# Patient Record
Sex: FEMALE | Race: WHITE | Employment: FULL TIME | URBAN - METROPOLITAN AREA
[De-identification: names, ages, dates, MRNs, and addresses within clinical notes are randomized per-mention and may not be internally consistent; named-entity substitution may affect disease eponyms.]

---

## 2018-10-31 ENCOUNTER — HOSPITAL ENCOUNTER (EMERGENCY)
Age: 18
Discharge: HOME | End: 2018-10-31
Attending: EMERGENCY MEDICINE
Payer: COMMERCIAL

## 2018-10-31 VITALS
WEIGHT: 200 LBS | HEART RATE: 103 BPM | SYSTOLIC BLOOD PRESSURE: 125 MMHG | BODY MASS INDEX: 37.76 KG/M2 | DIASTOLIC BLOOD PRESSURE: 93 MMHG | RESPIRATION RATE: 17 BRPM | OXYGEN SATURATION: 98 % | HEIGHT: 61 IN | TEMPERATURE: 98.6 F

## 2018-10-31 DIAGNOSIS — N93.9 VAGINAL BLEEDING: Primary | ICD-10-CM

## 2018-10-31 LAB
ALBUMIN SERPL-MCNC: 4.1 GM/DL (ref 3.4–5)
ALP BLD-CCNC: 119 IU/L (ref 40–129)
ALT SERPL-CCNC: 20 U/L (ref 10–40)
ANION GAP SERPL CALCULATED.3IONS-SCNC: 14 MMOL/L (ref 4–16)
AST SERPL-CCNC: 17 IU/L (ref 15–37)
BASOPHILS ABSOLUTE: 0.1 K/CU MM
BASOPHILS RELATIVE PERCENT: 0.4 % (ref 0–1)
BILIRUB SERPL-MCNC: 0.2 MG/DL (ref 0–1)
BUN BLDV-MCNC: 11 MG/DL (ref 6–23)
CALCIUM SERPL-MCNC: 9.3 MG/DL (ref 8.3–10.6)
CHLORIDE BLD-SCNC: 101 MMOL/L (ref 99–110)
CO2: 23 MMOL/L (ref 21–32)
CREAT SERPL-MCNC: 0.7 MG/DL (ref 0.6–1.1)
DIFFERENTIAL TYPE: ABNORMAL
EOSINOPHILS ABSOLUTE: 0.1 K/CU MM
EOSINOPHILS RELATIVE PERCENT: 0.7 % (ref 0–3)
GFR AFRICAN AMERICAN: >60 ML/MIN/1.73M2
GFR NON-AFRICAN AMERICAN: >60 ML/MIN/1.73M2
GLUCOSE BLD-MCNC: 94 MG/DL (ref 70–99)
GONADOTROPIN, CHORIONIC (HCG) QUANT: <0.5 UIU/ML
HCT VFR BLD CALC: 46.4 % (ref 37–47)
HEMOGLOBIN: 14.8 GM/DL (ref 12.5–16)
IMMATURE NEUTROPHIL %: 0.6 % (ref 0–0.43)
LYMPHOCYTES ABSOLUTE: 3.3 K/CU MM
LYMPHOCYTES RELATIVE PERCENT: 20.6 % (ref 24–44)
MCH RBC QN AUTO: 28.7 PG (ref 27–31)
MCHC RBC AUTO-ENTMCNC: 31.9 % (ref 32–36)
MCV RBC AUTO: 90.1 FL (ref 78–100)
MONOCYTES ABSOLUTE: 0.9 K/CU MM
MONOCYTES RELATIVE PERCENT: 5.3 % (ref 0–4)
NUCLEATED RBC %: 0 %
PDW BLD-RTO: 12.7 % (ref 11.7–14.9)
PLATELET # BLD: 367 K/CU MM (ref 140–440)
PMV BLD AUTO: 9.3 FL (ref 7.5–11.1)
POTASSIUM SERPL-SCNC: 3.8 MMOL/L (ref 3.5–5.1)
RBC # BLD: 5.15 M/CU MM (ref 4.2–5.4)
SEGMENTED NEUTROPHILS ABSOLUTE COUNT: 11.8 K/CU MM
SEGMENTED NEUTROPHILS RELATIVE PERCENT: 72.4 % (ref 36–66)
SODIUM BLD-SCNC: 138 MMOL/L (ref 135–145)
TOTAL IMMATURE NEUTOROPHIL: 0.09 K/CU MM
TOTAL NUCLEATED RBC: 0 K/CU MM
TOTAL PROTEIN: 8 GM/DL (ref 6.4–8.2)
WBC # BLD: 16.2 K/CU MM (ref 4–10.5)

## 2018-10-31 PROCEDURE — 86900 BLOOD TYPING SEROLOGIC ABO: CPT

## 2018-10-31 PROCEDURE — 86901 BLOOD TYPING SEROLOGIC RH(D): CPT

## 2018-10-31 PROCEDURE — 99283 EMERGENCY DEPT VISIT LOW MDM: CPT

## 2018-10-31 PROCEDURE — 84702 CHORIONIC GONADOTROPIN TEST: CPT

## 2018-10-31 PROCEDURE — 36415 COLL VENOUS BLD VENIPUNCTURE: CPT

## 2018-10-31 PROCEDURE — 80053 COMPREHEN METABOLIC PANEL: CPT

## 2018-10-31 PROCEDURE — 85025 COMPLETE CBC W/AUTO DIFF WBC: CPT

## 2018-10-31 ASSESSMENT — ENCOUNTER SYMPTOMS
EYE REDNESS: 0
BACK PAIN: 0
NAUSEA: 0
COUGH: 0
SHORTNESS OF BREATH: 1
ABDOMINAL PAIN: 1
SORE THROAT: 0
RHINORRHEA: 0
VOMITING: 0

## 2018-10-31 ASSESSMENT — PAIN DESCRIPTION - DESCRIPTORS: DESCRIPTORS: CRAMPING

## 2018-10-31 ASSESSMENT — PAIN SCALES - GENERAL: PAINLEVEL_OUTOF10: 2

## 2018-10-31 ASSESSMENT — PAIN DESCRIPTION - PAIN TYPE: TYPE: ACUTE PAIN

## 2018-10-31 ASSESSMENT — PAIN DESCRIPTION - ORIENTATION: ORIENTATION: LOWER

## 2018-10-31 ASSESSMENT — PAIN DESCRIPTION - LOCATION: LOCATION: ABDOMEN

## 2018-10-31 NOTE — ED TRIAGE NOTES
Patient presents ot ER today for vaginal bleeding starting on October 10th. . Patient states she was due to start her period, and that she has since non stop bled. States varies from light to moderate amounts with occasional clots.  Patient reports mild abd cramping and fatigue

## 2019-02-18 ENCOUNTER — HOSPITAL ENCOUNTER (EMERGENCY)
Age: 19
Discharge: LEFT BEFORE DISCHARGE | End: 2019-02-18
Payer: COMMERCIAL

## 2019-02-18 VITALS
BODY MASS INDEX: 41.54 KG/M2 | OXYGEN SATURATION: 98 % | WEIGHT: 220 LBS | RESPIRATION RATE: 16 BRPM | HEART RATE: 76 BPM | SYSTOLIC BLOOD PRESSURE: 127 MMHG | TEMPERATURE: 98.2 F | DIASTOLIC BLOOD PRESSURE: 74 MMHG | HEIGHT: 61 IN

## 2019-02-18 PROCEDURE — 93005 ELECTROCARDIOGRAM TRACING: CPT | Performed by: EMERGENCY MEDICINE

## 2019-02-18 PROCEDURE — 4500000002 HC ER NO CHARGE

## 2019-02-18 ASSESSMENT — PAIN SCALES - GENERAL: PAINLEVEL_OUTOF10: 4

## 2019-02-18 ASSESSMENT — PAIN DESCRIPTION - LOCATION: LOCATION: ABDOMEN;CHEST

## 2019-02-18 ASSESSMENT — PAIN DESCRIPTION - PAIN TYPE: TYPE: ACUTE PAIN

## 2019-02-19 PROCEDURE — 93010 ELECTROCARDIOGRAM REPORT: CPT | Performed by: INTERNAL MEDICINE

## 2019-02-20 LAB
EKG ATRIAL RATE: 78 BPM
EKG DIAGNOSIS: NORMAL
EKG P AXIS: 25 DEGREES
EKG P-R INTERVAL: 154 MS
EKG Q-T INTERVAL: 370 MS
EKG QRS DURATION: 84 MS
EKG QTC CALCULATION (BAZETT): 421 MS
EKG R AXIS: 10 DEGREES
EKG T AXIS: 9 DEGREES
EKG VENTRICULAR RATE: 78 BPM

## 2022-11-17 ENCOUNTER — OFFICE VISIT (OUTPATIENT)
Dept: FAMILY MEDICINE CLINIC | Age: 22
End: 2022-11-17
Payer: COMMERCIAL

## 2022-11-17 ENCOUNTER — HOSPITAL ENCOUNTER (OUTPATIENT)
Age: 22
Setting detail: SPECIMEN
Discharge: HOME | End: 2022-11-17
Payer: COMMERCIAL

## 2022-11-17 VITALS — WEIGHT: 205 LBS | HEART RATE: 104 BPM | BODY MASS INDEX: 38.73 KG/M2 | OXYGEN SATURATION: 99 % | TEMPERATURE: 97.7 F

## 2022-11-17 DIAGNOSIS — J10.1 INFLUENZA A: Primary | ICD-10-CM

## 2022-11-17 DIAGNOSIS — R68.89 FLU-LIKE SYMPTOMS: ICD-10-CM

## 2022-11-17 DIAGNOSIS — J02.9 SORE THROAT: ICD-10-CM

## 2022-11-17 LAB
INFLUENZA A ANTIBODY: POSITIVE
INFLUENZA B ANTIBODY: ABNORMAL

## 2022-11-17 PROCEDURE — 99203 OFFICE O/P NEW LOW 30 MIN: CPT | Performed by: NURSE PRACTITIONER

## 2022-11-17 PROCEDURE — U0003 INFECTIOUS AGENT DETECTION BY NUCLEIC ACID (DNA OR RNA); SEVERE ACUTE RESPIRATORY SYNDROME CORONAVIRUS 2 (SARS-COV-2) (CORONAVIRUS DISEASE [COVID-19]), AMPLIFIED PROBE TECHNIQUE, MAKING USE OF HIGH THROUGHPUT TECHNOLOGIES AS DESCRIBED BY CMS-2020-01-R: HCPCS

## 2022-11-17 PROCEDURE — 87804 INFLUENZA ASSAY W/OPTIC: CPT | Performed by: NURSE PRACTITIONER

## 2022-11-17 PROCEDURE — U0005 INFEC AGEN DETEC AMPLI PROBE: HCPCS

## 2022-11-17 RX ORDER — OSELTAMIVIR PHOSPHATE 75 MG/1
75 CAPSULE ORAL 2 TIMES DAILY
Qty: 10 CAPSULE | Refills: 0 | Status: SHIPPED | OUTPATIENT
Start: 2022-11-17 | End: 2022-11-22

## 2022-11-17 NOTE — PATIENT INSTRUCTIONS
Your COVID 19 test can take 1-5 days for the results to come back. We ask that you make a Mychart page and view your test results this way. You are encouraged to Self quarantine until you know your results. If positive, please work on contact tracing. Increase fluids and rest  Saline nasal spray as needed for nasal congestion  Warm salt water gargles as needed for throat discomfort  Monitor temperature twice a day  Tylenol as needed for fevers and/or discomfort. Big deep breaths periodically throughout the day  Regular Mucinex over the counter as needed for chest congestion  If symptoms worsen -Go to the ER. Follow up with your primary care provider      To Whom it May Concern:    Tiarra Cotto was tested for COVID-19 11/17/2022. He/she must stay home until test results are back. If test is negative, ok to return to work/school. If test is positive, isolate for a total of 5 days, starting from day 1 of symptom onset. After 5 days, if fever-free for 24 hours and there has been a gradual improvement in symptoms, may come out of isolation, but must consistently wear a mask when around other people for 5 additional days. (5 days isolation, 5 days mask-wearing). We do not recommend retesting as patients may continue to test positive for months even though no longer contagious. It is suggested you call [address] or [address] with any questions regarding isolation timeframe/return to work/school details.

## 2022-11-17 NOTE — PROGRESS NOTES
11/17/22  Vasile Stewart  2000    FLU/COVID-19 CLINIC EVALUATION    HPI SYMPTOMS:    Employer:    [x] Fevers  [] Chills  [x] Cough  [] Coughing up blood  [] Chest Congestion  [] Nasal Congestion  [] Feeling short of breath  [] Sometimes  [] Frequently  [] All the time  [x] Chest pain  [x] Headaches  [x]Tolerable  [] Severe  [x] Sore throat  [x] Muscle aches  [x] Nausea  [] Vomiting  []Unable to keep fluids down  [x] Diarrhea  []Severe    [x] OTHER SYMPTOMS:    Fatigue    Symptom Duration:   [] 1  [] 2   [] 3   [] 4    [] 5   [] 6   [] 7   [] 8   [] 9   [] 10   [] 11   [] 12   [] 13   [] 14   [] Longer than 14 days    Symptom course:   [] Worsening     [x] Stable     [] Improving    RISK FACTORS:    [] Pregnant or possibly pregnant  [] Age over 61  [] Diabetes  [] Heart disease  [] Asthma  [] COPD/Other chronic lung diseases  [] Active Cancer  [] On Chemotherapy  [] Taking oral steroids  [] History Lymphoma/Leukemia  [] Close contact with a lab confirmed COVID-19 patient within 14 days of symptom onset  [] History of travel from affected geographical areas within 14 days of symptom onset       VITALS:  There were no vitals filed for this visit. TESTS:    POCT FLU:  [] Positive     []Negative    ASSESSMENT:    [] Flu  [] Possible COVID-19  [] Strep    PLAN:    [] Discharge home with written instructions for:  [] Flu management  [] Possible COVID-19 infection with self-quarantine and management of symptoms  [] Follow-up with primary care physician or emergency department if worsens  [] Evaluation per physician/NP/PA in clinic  [] Sent to ER       An  electronic signature was used to authenticate this note.      --Nila Green on 11/17/2022 at 8:48 AM

## 2022-11-17 NOTE — PROGRESS NOTES
11/17/2022    HPI:  Chief complaint and history of present illness as per medical assistant/nurse documented today in the Flu/COVID-19 clinic. Patient is here with complaints of cough, headache, muscle aches, nausea, diarrhea, and fatigue that started last night. Patient states she has had problems with a sore throat since September. Patient states she recently saw an ENT and he started her on Clindamycin. Patient states she is not here for the sore throat - just the symptoms that started las night. Patient states she has had the covid vaccine. MEDICATIONS:  Prior to Visit Medications    Not on File       No Known Allergies, History reviewed. No pertinent past medical history. , History reviewed. No pertinent surgical history. ,   Social History     Tobacco Use    Smoking status: Never    Smokeless tobacco: Never   Substance Use Topics    Alcohol use: No    Drug use: No   , History reviewed. No pertinent family history. ,   There is no immunization history on file for this patient.,   Health Maintenance   Topic Date Due    COVID-19 Vaccine (1) Never done    Varicella vaccine (1 of 2 - 2-dose childhood series) Never done    HPV vaccine (1 - 2-dose series) Never done    Depression Screen  Never done    HIV screen  Never done    Chlamydia/GC screen  Never done    Hepatitis C screen  Never done    DTaP/Tdap/Td vaccine (1 - Tdap) Never done    Pap smear  Never done    Flu vaccine (1) Never done    Hepatitis A vaccine  Aged Out    Hib vaccine  Aged Out    Meningococcal (ACWY) vaccine  Aged Out    Pneumococcal 0-64 years Vaccine  Aged Out       PHYSICAL EXAM:  Physical Exam  Constitutional:       Appearance: Normal appearance. HENT:      Head: Normocephalic. Right Ear: Tympanic membrane, ear canal and external ear normal.      Left Ear: Tympanic membrane, ear canal and external ear normal.      Nose: Nose normal.      Mouth/Throat:      Lips: Pink.       Mouth: Mucous membranes are moist.      Pharynx: Posterior oropharyngeal erythema present. Cardiovascular:      Rate and Rhythm: Normal rate and regular rhythm. Heart sounds: Normal heart sounds. Pulmonary:      Effort: Pulmonary effort is normal.      Breath sounds: Normal breath sounds. Musculoskeletal:      Cervical back: Neck supple. Skin:     General: Skin is warm and dry. Neurological:      Mental Status: She is alert and oriented to person, place, and time. Psychiatric:         Mood and Affect: Mood normal.         Behavior: Behavior normal.       ASSESSMENT/PLAN:  1. Influenza A  Advised to take medication as prescribed. - oseltamivir (TAMIFLU) 75 MG capsule; Take 1 capsule by mouth 2 times daily for 5 days  Dispense: 10 capsule; Refill: 0    2. Flu-like symptoms  Positive for Influenza A . Patient wanted the covid test sent out. Your COVID 19 test can take 1-5 days for the results to come back. We ask that you make a Mychart page and view your test results this way. You are encouraged to Self quarantine until you know your results. If positive, please work on contact tracing. Increase fluids and rest  Saline nasal spray as needed for nasal congestion  Warm salt water gargles as needed for throat discomfort  Monitor temperature twice a day  Tylenol as needed for fevers and/or discomfort. Big deep breaths periodically throughout the day  Regular Mucinex over the counter as needed for chest congestion  If symptoms worsen -Go to the ER. Follow up with your primary care provider  - POCT Influenza A/B  - COVID-19    3. Sore throat  Continue Clindamycin prescribed by ENT. FOLLOW-UP:  Return if symptoms worsen or fail to improve.     In addition to other information, the printed after visit summary provided to the patient includes:  [x] COVID-19 Self care instructions  [x] COVID-19 General patient information

## 2022-11-18 ENCOUNTER — TELEPHONE (OUTPATIENT)
Dept: FAMILY MEDICINE CLINIC | Age: 22
End: 2022-11-18

## 2022-11-18 LAB
SARS-COV-2: NOT DETECTED
SOURCE: NORMAL

## 2022-11-18 NOTE — TELEPHONE ENCOUNTER
Pt called stating she needs a note stating when she may return to work and without restrictions. Pt was informed the provider that saw her would not be in the office before Monday so this may not be completed before then. Please advise.

## 2022-11-18 NOTE — LETTER
[address] [facility] [facility]  [address] [place] [address]  [id number]  Phone: [phone]  Fax: [phone]    LEANDRA Evans CNP        November 18, 2022     Patient: Tiarra Cough   YOB: 2000   Date of Visit: 11/17/2022       To Whom it May Concern:    Richard Landers was seen in my clinic on 11/17/2022. She may return to work on 11/21/2022 with no restrictions as long as fever free for 24 hours. If you have any questions or concerns, please don't hesitate to call.     Sincerely,         LEANDRA Evans CNP

## 2022-11-18 NOTE — TELEPHONE ENCOUNTER
Pt never said she was taken off work with restrictions, pt needs a note stating she may return to work and it has to say without restrictions. The note received in the AVS states she is off work until results come back on the covid test. Since pt tested positive for the flu and negative for covid, she needs a note stating when she may return to work and it has to say without restrictions.

## 2022-11-18 NOTE — Clinical Note
[address]  Phone: [phone]  Fax: [phone]    LEANDRA Selby CNP        November 18, 2022    [address] [place] [address]      Dear Barry Haro:    ***    If you have any questions or concerns, please don't hesitate to call.     Sincerely,        LEANDRA Selby CNP
Statement Selected

## 2023-02-28 ENCOUNTER — HOSPITAL ENCOUNTER (EMERGENCY)
Age: 23
Discharge: LEFT BEFORE DISCHARGE | End: 2023-02-28
Payer: COMMERCIAL

## 2023-02-28 VITALS
RESPIRATION RATE: 18 BRPM | TEMPERATURE: 99.4 F | OXYGEN SATURATION: 100 % | SYSTOLIC BLOOD PRESSURE: 104 MMHG | HEART RATE: 110 BPM | DIASTOLIC BLOOD PRESSURE: 70 MMHG

## 2023-02-28 DIAGNOSIS — Z53.21 ELOPED FROM EMERGENCY DEPARTMENT: Primary | ICD-10-CM

## 2023-02-28 LAB
BACTERIA: NEGATIVE /HPF
BILIRUBIN URINE: NEGATIVE MG/DL
BLOOD, URINE: NEGATIVE
CLARITY: CLEAR
COLOR: YELLOW
GLUCOSE, URINE: NEGATIVE MG/DL
HYALINE CASTS: 2 /LPF
KETONES, URINE: 40 MG/DL
LEUKOCYTE ESTERASE, URINE: NEGATIVE
MUCUS: ABNORMAL HPF
NITRITE URINE, QUANTITATIVE: NEGATIVE
NON SQUAM EPI CELLS: <1 /HPF
PH, URINE: 6 (ref 5–8)
PREGNANCY TEST URINE, POC: NEGATIVE
PROTEIN UA: 100 MG/DL
RBC URINE: 2 /HPF (ref 0–6)
SPECIFIC GRAVITY UA: 1.02 (ref 1–1.03)
SQUAMOUS EPITHELIAL: 12 /HPF
TRICHOMONAS: ABNORMAL /HPF
UROBILINOGEN, URINE: 0.2 MG/DL (ref 0.2–1)
WBC UA: ABNORMAL /HPF (ref 0–5)

## 2023-02-28 PROCEDURE — 85025 COMPLETE CBC W/AUTO DIFF WBC: CPT

## 2023-02-28 PROCEDURE — 81025 URINE PREGNANCY TEST: CPT

## 2023-02-28 PROCEDURE — 4500000002 HC ER NO CHARGE

## 2023-02-28 PROCEDURE — 80053 COMPREHEN METABOLIC PANEL: CPT

## 2023-02-28 PROCEDURE — 81001 URINALYSIS AUTO W/SCOPE: CPT

## 2023-02-28 NOTE — ED PROVIDER NOTES
Not evaluate or treat this patient. Triage orders were placed at however the patient left prior to being seen.      Destiny Shaffer, LEANDRA - CNP  02/28/23 [id number]

## 2023-03-25 LAB
A/G RATIO: 1.5 (ref 1.2–2.2)
ALBUMIN SERPL-MCNC: 4.2 G/DL (ref 3.9–5)
ALP BLD-CCNC: 79 IU/L (ref 44–121)
ALT SERPL-CCNC: 25 IU/L (ref 0–32)
AST SERPL-CCNC: 21 IU/L (ref 0–40)
BASOPHILS ABSOLUTE: 0 X10E3/UL (ref 0–0.2)
BASOPHILS RELATIVE PERCENT: 0 %
BILIRUB SERPL-MCNC: 0.2 MG/DL (ref 0–1.2)
BUN / CREAT RATIO: 14 (ref 9–23)
BUN BLDV-MCNC: 11 MG/DL (ref 6–20)
CALCIUM SERPL-MCNC: 9.3 MG/DL (ref 8.7–10.2)
CHLORIDE BLD-SCNC: 108 MMOL/L (ref 96–106)
CHOLESTEROL, TOTAL: 145 MG/DL (ref 100–199)
CO2: 20 MMOL/L (ref 20–29)
CREAT SERPL-MCNC: 0.76 MG/DL (ref 0.57–1)
EOSINOPHILS ABSOLUTE: 0.1 X10E3/UL (ref 0–0.4)
EOSINOPHILS RELATIVE PERCENT: 1 %
ESTIMATED GLOMERULAR FILTRATION RATE CREATININE EQUATION: 112 ML/MIN/1.73
GLOBULIN: 2.8 G/DL (ref 1.5–4.5)
GLUCOSE BLD-MCNC: 89 MG/DL (ref 70–99)
HCT VFR BLD CALC: 42.7 % (ref 34–46.6)
HDLC SERPL-MCNC: 42 MG/DL
HEMOGLOBIN: 14.3 G/DL (ref 11.1–15.9)
IMMATURE GRANS (ABS): 0 X10E3/UL (ref 0–0.1)
IMMATURE GRANULOCYTES: 0 %
LDL CHOLESTEROL CALCULATED: 92 MG/DL (ref 0–99)
LYMPHOCYTES ABSOLUTE: 3 X10E3/UL (ref 0.7–3.1)
LYMPHOCYTES RELATIVE PERCENT: 42 %
MCH RBC QN AUTO: 28.9 PG (ref 26.6–33)
MCHC RBC AUTO-ENTMCNC: 33.5 G/DL (ref 31.5–35.7)
MCV RBC AUTO: 86 FL (ref 79–97)
MONOCYTES ABSOLUTE: 0.5 X10E3/UL (ref 0.1–0.9)
MONOCYTES RELATIVE PERCENT: 7 %
NEUTROPHILS ABSOLUTE: 3.5 X10E3/UL (ref 1.4–7)
PDW BLD-RTO: 12.9 % (ref 11.7–15.4)
PLATELET # BLD: 349 X10E3/UL (ref 150–450)
POTASSIUM SERPL-SCNC: 4.6 MMOL/L (ref 3.5–5.2)
RBC # BLD: 4.95 X10E6/UL (ref 3.77–5.28)
SEGMENTED NEUTROPHILS RELATIVE PERCENT: 50 %
SODIUM BLD-SCNC: 139 MMOL/L (ref 134–144)
TOTAL PROTEIN: 7 G/DL (ref 6–8.5)
TRIGL SERPL-MCNC: 50 MG/DL (ref 0–149)
TSH SERPL DL<=0.05 MIU/L-ACNC: 1.12 UIU/ML (ref 0.45–4.5)
VLDLC SERPL CALC-MCNC: 11 MG/DL (ref 5–40)
WBC # BLD: 7.1 X10E3/UL (ref 3.4–10.8)

## 2023-03-31 ENCOUNTER — OFFICE VISIT (OUTPATIENT)
Dept: FAMILY MEDICINE CLINIC | Age: 23
End: 2023-03-31
Payer: COMMERCIAL

## 2023-03-31 VITALS
SYSTOLIC BLOOD PRESSURE: 106 MMHG | HEIGHT: 60 IN | DIASTOLIC BLOOD PRESSURE: 68 MMHG | WEIGHT: 200 LBS | HEART RATE: 86 BPM | TEMPERATURE: 97.9 F | BODY MASS INDEX: 39.27 KG/M2 | OXYGEN SATURATION: 97 %

## 2023-03-31 DIAGNOSIS — Z00.00 ENCOUNTER FOR ROUTINE HISTORY AND PHYSICAL EXAMINATION: Primary | ICD-10-CM

## 2023-03-31 DIAGNOSIS — Z23 NEED FOR INFLUENZA VACCINATION: ICD-10-CM

## 2023-03-31 DIAGNOSIS — Z11.1 TUBERCULOSIS SCREENING: ICD-10-CM

## 2023-03-31 DIAGNOSIS — Z23 NEED FOR TDAP VACCINATION: ICD-10-CM

## 2023-03-31 PROCEDURE — 90472 IMMUNIZATION ADMIN EACH ADD: CPT | Performed by: NURSE PRACTITIONER

## 2023-03-31 PROCEDURE — 99385 PREV VISIT NEW AGE 18-39: CPT | Performed by: NURSE PRACTITIONER

## 2023-03-31 PROCEDURE — 90674 CCIIV4 VAC NO PRSV 0.5 ML IM: CPT | Performed by: NURSE PRACTITIONER

## 2023-03-31 PROCEDURE — 36415 COLL VENOUS BLD VENIPUNCTURE: CPT | Performed by: NURSE PRACTITIONER

## 2023-03-31 PROCEDURE — 90715 TDAP VACCINE 7 YRS/> IM: CPT | Performed by: NURSE PRACTITIONER

## 2023-03-31 PROCEDURE — 90471 IMMUNIZATION ADMIN: CPT | Performed by: NURSE PRACTITIONER

## 2023-03-31 RX ORDER — NORETHINDRONE ACETATE AND ETHINYL ESTRADIOL 1MG-20(21)
1 KIT ORAL DAILY
COMMUNITY

## 2023-03-31 SDOH — ECONOMIC STABILITY: FOOD INSECURITY: WITHIN THE PAST 12 MONTHS, THE FOOD YOU BOUGHT JUST DIDN'T LAST AND YOU DIDN'T HAVE MONEY TO GET MORE.: NEVER TRUE

## 2023-03-31 SDOH — ECONOMIC STABILITY: HOUSING INSECURITY
IN THE LAST 12 MONTHS, WAS THERE A TIME WHEN YOU DID NOT HAVE A STEADY PLACE TO SLEEP OR SLEPT IN A SHELTER (INCLUDING NOW)?: NO

## 2023-03-31 SDOH — ECONOMIC STABILITY: FOOD INSECURITY: WITHIN THE PAST 12 MONTHS, YOU WORRIED THAT YOUR FOOD WOULD RUN OUT BEFORE YOU GOT MONEY TO BUY MORE.: NEVER TRUE

## 2023-03-31 SDOH — ECONOMIC STABILITY: INCOME INSECURITY: HOW HARD IS IT FOR YOU TO PAY FOR THE VERY BASICS LIKE FOOD, HOUSING, MEDICAL CARE, AND HEATING?: NOT HARD AT ALL

## 2023-03-31 ASSESSMENT — ENCOUNTER SYMPTOMS
SHORTNESS OF BREATH: 0
EYES NEGATIVE: 1
CHEST TIGHTNESS: 0
WHEEZING: 0
ALLERGIC/IMMUNOLOGIC NEGATIVE: 1
GASTROINTESTINAL NEGATIVE: 1
COUGH: 0

## 2023-03-31 ASSESSMENT — PATIENT HEALTH QUESTIONNAIRE - PHQ9
2. FEELING DOWN, DEPRESSED OR HOPELESS: 0
SUM OF ALL RESPONSES TO PHQ QUESTIONS 1-9: 0
1. LITTLE INTEREST OR PLEASURE IN DOING THINGS: 0
SUM OF ALL RESPONSES TO PHQ QUESTIONS 1-9: 0
SUM OF ALL RESPONSES TO PHQ9 QUESTIONS 1 & 2: 0

## 2023-03-31 ASSESSMENT — VISUAL ACUITY
OD_CC: 20 70
OS_CC: 20 20

## 2023-03-31 NOTE — PROGRESS NOTES
Right arm lab draw; pt tolerated well. TDAP right arm; pt tolerated well. Flu vaccine left deltoid; pt tolerated well. Vaccine Information Sheet, \"Influenza - Inactivated\"  given to Adriano Patel, or parent/legal guardian of  Adriano Patel and verbalized understanding. Patient responses:    Have you ever had a reaction to a flu vaccine? No  Do you have any current illness? No  Have you ever had Guillian Crocheron Syndrome? No  Do you have a serious allergy to any of the follow: Neomycin, Polymyxin, Thimerosal, eggs or egg products? No    Flu vaccine given per order. Please see immunization tab. Risks and benefits explained. Current VIS given.
Out of Food in the Last Year: Never true    Ran Out of Food in the Last Year: Never true   Transportation Needs: Unknown    Lack of Transportation (Medical): Not on file    Lack of Transportation (Non-Medical): No   Physical Activity: Not on file   Stress: Not on file   Social Connections: Not on file   Intimate Partner Violence: Not on file   Housing Stability: Unknown    Unable to Pay for Housing in the Last Year: Not on file    Number of Places Lived in the Last Year: Not on file    Unstable Housing in the Last Year: No       Review of Systems   Constitutional:  Negative for activity change, appetite change, chills, diaphoresis, fatigue, fever and unexpected weight change. HENT: Negative. Eyes: Negative. Respiratory:  Negative for cough, chest tightness, shortness of breath and wheezing. Cardiovascular:  Negative for chest pain and palpitations. Gastrointestinal: Negative. Endocrine: Negative. Genitourinary: Negative. Musculoskeletal: Negative. Skin: Negative. Allergic/Immunologic: Negative. Neurological: Negative. Hematological: Negative. Psychiatric/Behavioral: Negative. OBJECTIVE:     /68 (Site: Right Upper Arm, Position: Sitting, Cuff Size: Medium Adult)   Pulse 86   Temp 97.9 °F (36.6 °C) (Infrared)   Ht 4' 11.65\" (1.515 m)   Wt 200 lb (90.7 kg)   LMP 03/10/2023 (Approximate)   SpO2 97%   BMI 39.52 kg/m²     Physical Exam  Vitals reviewed. Constitutional:       General: She is not in acute distress. Appearance: Normal appearance. She is well-developed. She is obese. She is not ill-appearing or diaphoretic. HENT:      Head: Normocephalic and atraumatic. Right Ear: Tympanic membrane, ear canal and external ear normal. There is no impacted cerumen. Left Ear: Tympanic membrane, ear canal and external ear normal. There is no impacted cerumen. Nose: Nose normal. No congestion or rhinorrhea.       Mouth/Throat:      Mouth: Mucous

## 2023-04-03 LAB
GAMMA INTERFERON BACKGROUND BLD IA-ACNC: 0.02 IU/ML
MITOGEN IGNF BCKGRD COR BLD-ACNC: >10 IU/ML
QUANTI TB GOLD PLUS: NEGATIVE
QUANTI TB1 MINUS NIL: 0 IU/ML (ref 0–0.34)
QUANTI TB2 MINUS NIL: 0.01 IU/ML (ref 0–0.34)

## 2023-12-26 ENCOUNTER — OFFICE VISIT (OUTPATIENT)
Dept: FAMILY MEDICINE CLINIC | Age: 23
End: 2023-12-26
Payer: COMMERCIAL

## 2023-12-26 VITALS
OXYGEN SATURATION: 98 % | TEMPERATURE: 98.2 F | HEART RATE: 99 BPM | BODY MASS INDEX: 41.11 KG/M2 | WEIGHT: 208 LBS | DIASTOLIC BLOOD PRESSURE: 70 MMHG | SYSTOLIC BLOOD PRESSURE: 112 MMHG

## 2023-12-26 DIAGNOSIS — U07.1 COVID-19: Primary | ICD-10-CM

## 2023-12-26 DIAGNOSIS — R05.1 ACUTE COUGH: ICD-10-CM

## 2023-12-26 DIAGNOSIS — R09.81 NASAL CONGESTION: ICD-10-CM

## 2023-12-26 DIAGNOSIS — R11.0 NAUSEA: ICD-10-CM

## 2023-12-26 LAB
Lab: ABNORMAL
PERFORMING INSTRUMENT: ABNORMAL
QC PASS/FAIL: ABNORMAL
SARS-COV-2, POC: DETECTED

## 2023-12-26 PROCEDURE — 87426 SARSCOV CORONAVIRUS AG IA: CPT | Performed by: NURSE PRACTITIONER

## 2023-12-26 PROCEDURE — 99213 OFFICE O/P EST LOW 20 MIN: CPT | Performed by: NURSE PRACTITIONER

## 2023-12-26 RX ORDER — ONDANSETRON 4 MG/1
4 TABLET, ORALLY DISINTEGRATING ORAL 3 TIMES DAILY PRN
Qty: 21 TABLET | Refills: 0 | Status: SHIPPED | OUTPATIENT
Start: 2023-12-26

## 2023-12-26 RX ORDER — FLUTICASONE PROPIONATE 50 MCG
2 SPRAY, SUSPENSION (ML) NASAL DAILY
Qty: 16 G | Refills: 0 | Status: SHIPPED | OUTPATIENT
Start: 2023-12-26

## 2023-12-26 RX ORDER — BENZONATATE 100 MG/1
100 CAPSULE ORAL 3 TIMES DAILY PRN
Qty: 30 CAPSULE | Refills: 0 | Status: SHIPPED | OUTPATIENT
Start: 2023-12-26 | End: 2024-01-05

## 2023-12-26 NOTE — PROGRESS NOTES
Jillian Franco (:  2000) is a 20 y.o. female,Established patient, here for evaluation of the following chief complaint(s):    URI (Congestion sore throat body aches-started Thursday)      SUBJECTIVE/OBJECTIVE:  Pt presents with c/o as stated below - onset of symptoms was 6 days ago - was exposed to ill co worker whose wife had Covid but coworker tested negative. URI   This is a new problem. The current episode started in the past 7 days (5 days ago). The problem has been unchanged. There has been no fever. Associated symptoms include congestion (nasal and chest), coughing, headaches, nausea, neck pain (kind of bilateral), a plugged ear sensation (a little clogged), rhinorrhea, sneezing, a sore throat (getting better) and swollen glands. Pertinent negatives include no abdominal pain, chest pain, diarrhea, dysuria, ear pain, joint pain, joint swelling, rash, sinus pain, vomiting or wheezing. Treatments tried: mucus relief and nyquil as well as nausea medication. The treatment provided no relief. No Known Allergies     Current Outpatient Medications   Medication Sig Dispense Refill    ondansetron (ZOFRAN-ODT) 4 MG disintegrating tablet Take 1 tablet by mouth 3 times daily as needed for Nausea or Vomiting 21 tablet 0    fluticasone (FLONASE) 50 MCG/ACT nasal spray 2 sprays by Each Nostril route daily 16 g 0    benzonatate (TESSALON) 100 MG capsule Take 1 capsule by mouth 3 times daily as needed for Cough 30 capsule 0    norethindrone-ethinyl estradiol (JUNEL FE 1/20) 1-20 MG-MCG per tablet Take 1 mg by mouth daily       No current facility-administered medications for this visit. Review of Systems   HENT:  Positive for congestion (nasal and chest), rhinorrhea, sinus pressure, sneezing, sore throat (getting better) and voice change (a little hoarse). Negative for ear pain and sinus pain. Respiratory:  Positive for cough. Negative for chest tightness, shortness of breath and wheezing.

## 2023-12-26 NOTE — PATIENT INSTRUCTIONS
Increase fluids and rest  Saline nasal spray as needed for nasal congestion  Warm salt gargles as needed for throat discomfort  Monitor temperature twice a day  Tylenol as needed for fevers and/or discomfort. Big deep breaths periodically throughout the day  Regular Mucinex over the counter as needed for chest congestion  If symptoms worsen -Go to the ER.    Follow up with your primary care provider  Isolate for 5 days then wear a mask for 5 days

## 2023-12-27 ENCOUNTER — TELEPHONE (OUTPATIENT)
Dept: FAMILY MEDICINE CLINIC | Age: 23
End: 2023-12-27

## 2023-12-27 NOTE — TELEPHONE ENCOUNTER
Pt called stating she went to work today and they sent her home d/t testing positive for covid, I explained that if sx started 7 days ago now then she is out of the quarantine period and just needs to wear a mask for 5 days, but she states that her employer is requiring a note. Please advise.

## 2023-12-28 ENCOUNTER — TELEPHONE (OUTPATIENT)
Dept: INTERNAL MEDICINE CLINIC | Age: 23
End: 2023-12-28

## 2023-12-28 NOTE — TELEPHONE ENCOUNTER
I created a return to work note for her - she just needs to wear a mask for 5 days after returning to work - she is past her isolation period.

## 2023-12-28 NOTE — TELEPHONE ENCOUNTER
Marcela Michele contacted office needing a work excuse for 5 days off due to having covid. Please advise Thank you

## 2024-03-24 ASSESSMENT — PATIENT HEALTH QUESTIONNAIRE - PHQ9
2. FEELING DOWN, DEPRESSED OR HOPELESS: NOT AT ALL
SUM OF ALL RESPONSES TO PHQ9 QUESTIONS 1 & 2: 0
1. LITTLE INTEREST OR PLEASURE IN DOING THINGS: NOT AT ALL
SUM OF ALL RESPONSES TO PHQ QUESTIONS 1-9: 0
1. LITTLE INTEREST OR PLEASURE IN DOING THINGS: NOT AT ALL
SUM OF ALL RESPONSES TO PHQ QUESTIONS 1-9: 0
SUM OF ALL RESPONSES TO PHQ9 QUESTIONS 1 & 2: 0
SUM OF ALL RESPONSES TO PHQ QUESTIONS 1-9: 0
2. FEELING DOWN, DEPRESSED OR HOPELESS: NOT AT ALL
SUM OF ALL RESPONSES TO PHQ QUESTIONS 1-9: 0

## 2024-03-25 ENCOUNTER — NURSE ONLY (OUTPATIENT)
Dept: FAMILY MEDICINE CLINIC | Age: 24
End: 2024-03-25
Payer: COMMERCIAL

## 2024-03-25 DIAGNOSIS — Z11.1 TUBERCULOSIS SCREENING: Primary | ICD-10-CM

## 2024-03-25 PROCEDURE — 86580 TB INTRADERMAL TEST: CPT | Performed by: NURSE PRACTITIONER

## 2024-03-25 NOTE — PROGRESS NOTES
Pt came in today for PPD #1 on right forearm at 1415, Pt will come back in 3/27/24 at 1415 for a reading. No further action needed.

## 2024-03-27 ENCOUNTER — OFFICE VISIT (OUTPATIENT)
Dept: FAMILY MEDICINE CLINIC | Age: 24
End: 2024-03-27

## 2024-03-27 DIAGNOSIS — Z11.1 ENCOUNTER FOR PPD SKIN TEST READING: Primary | ICD-10-CM

## 2024-03-27 LAB
INDURATION: NORMAL
TB SKIN TEST: NORMAL

## 2024-03-27 PROCEDURE — 99999 PR OFFICE/OUTPT VISIT,PROCEDURE ONLY: CPT | Performed by: NURSE PRACTITIONER

## 2024-06-24 SDOH — HEALTH STABILITY: PHYSICAL HEALTH: ON AVERAGE, HOW MANY DAYS PER WEEK DO YOU ENGAGE IN MODERATE TO STRENUOUS EXERCISE (LIKE A BRISK WALK)?: 3 DAYS

## 2024-06-24 SDOH — HEALTH STABILITY: PHYSICAL HEALTH: ON AVERAGE, HOW MANY MINUTES DO YOU ENGAGE IN EXERCISE AT THIS LEVEL?: 60 MIN

## 2024-06-25 ENCOUNTER — OFFICE VISIT (OUTPATIENT)
Dept: INTERNAL MEDICINE CLINIC | Age: 24
End: 2024-06-25
Payer: COMMERCIAL

## 2024-06-25 VITALS
WEIGHT: 211 LBS | RESPIRATION RATE: 20 BRPM | BODY MASS INDEX: 41.43 KG/M2 | SYSTOLIC BLOOD PRESSURE: 110 MMHG | DIASTOLIC BLOOD PRESSURE: 76 MMHG | HEART RATE: 100 BPM | OXYGEN SATURATION: 97 % | HEIGHT: 60 IN

## 2024-06-25 DIAGNOSIS — M79.89 SOFT TISSUE MASS: ICD-10-CM

## 2024-06-25 DIAGNOSIS — Z13.1 SCREENING FOR DIABETES MELLITUS: ICD-10-CM

## 2024-06-25 DIAGNOSIS — Z13.29 SCREENING FOR THYROID DISORDER: ICD-10-CM

## 2024-06-25 DIAGNOSIS — Z00.00 ENCOUNTER FOR MEDICAL EXAMINATION TO ESTABLISH CARE: Primary | ICD-10-CM

## 2024-06-25 DIAGNOSIS — K21.9 GASTROESOPHAGEAL REFLUX DISEASE, UNSPECIFIED WHETHER ESOPHAGITIS PRESENT: ICD-10-CM

## 2024-06-25 PROCEDURE — 99204 OFFICE O/P NEW MOD 45 MIN: CPT | Performed by: NURSE PRACTITIONER

## 2024-06-25 RX ORDER — PANTOPRAZOLE SODIUM 40 MG/1
40 TABLET, DELAYED RELEASE ORAL
Qty: 30 TABLET | Refills: 3 | Status: SHIPPED | OUTPATIENT
Start: 2024-06-25

## 2024-06-25 SDOH — ECONOMIC STABILITY: FOOD INSECURITY: WITHIN THE PAST 12 MONTHS, YOU WORRIED THAT YOUR FOOD WOULD RUN OUT BEFORE YOU GOT MONEY TO BUY MORE.: NEVER TRUE

## 2024-06-25 SDOH — ECONOMIC STABILITY: INCOME INSECURITY: HOW HARD IS IT FOR YOU TO PAY FOR THE VERY BASICS LIKE FOOD, HOUSING, MEDICAL CARE, AND HEATING?: NOT HARD AT ALL

## 2024-06-25 SDOH — ECONOMIC STABILITY: FOOD INSECURITY: WITHIN THE PAST 12 MONTHS, THE FOOD YOU BOUGHT JUST DIDN'T LAST AND YOU DIDN'T HAVE MONEY TO GET MORE.: NEVER TRUE

## 2024-06-25 ASSESSMENT — ENCOUNTER SYMPTOMS
SINUS PRESSURE: 0
APNEA: 0
DIARRHEA: 0
SHORTNESS OF BREATH: 0
COLOR CHANGE: 0
COUGH: 0
SINUS PAIN: 0
NAUSEA: 0
CHEST TIGHTNESS: 0
VOMITING: 0
ABDOMINAL PAIN: 0

## 2024-06-25 NOTE — PROGRESS NOTES
Marcelageoff Michele   23 y.o.  female  [id number]      Chief Complaint   Patient presents with    New Patient    Heartburn    Mass     On spine. Pt reports that she has had it for a really long time and isnt sure what is causing it        Subjective:  Patient is here to establish care. She was a previous patinet of Dr Jordana Kam.  Patient has a history of GERD; and current complaints are as below.  Health maintenance reviewed with patient.  Patient does not smoke.  Patient  does not use drugs.    GERD- has been a chronic issues for many years. Has tried multiple medications primarily omeprazole OTC daily. If she misses a dose it does get worse. Still has ongoing symptoms despite this med use. Denies any blood in stool or black stools.     Spine mass/lower back. Has been there approx 6 months. Not painful, no drainage. Right lateral/ upper lumbar region.     Requesting to be screening formally for DM.     Patient's past medical, surgical, social and/or family history reviewed, updated in chart..  Medications and allergies also reviewed and updatedin chart.      OBGYN care UTD- sees Dr Subramanian.     Review of Systems   Constitutional:  Negative for activity change, appetite change, fatigue and fever.   HENT:  Negative for congestion, nosebleeds, sinus pressure and sinus pain.    Respiratory:  Negative for apnea, cough, chest tightness and shortness of breath.    Cardiovascular:  Negative for chest pain and palpitations.   Gastrointestinal:  Negative for abdominal pain, diarrhea, nausea and vomiting.   Genitourinary:  Negative for difficulty urinating, flank pain and hematuria.   Musculoskeletal:  Negative for arthralgias, joint swelling and myalgias.   Skin:  Negative for color change and rash.   Neurological:  Negative for dizziness, light-headedness and headaches.   Psychiatric/Behavioral: Negative.  Negative for behavioral problems.        Current Outpatient Medications   Medication Sig Dispense Refill    ondansetron

## 2024-10-25 ENCOUNTER — OFFICE VISIT (OUTPATIENT)
Dept: INTERNAL MEDICINE CLINIC | Age: 24
End: 2024-10-25

## 2024-10-25 VITALS
RESPIRATION RATE: 18 BRPM | HEIGHT: 60 IN | OXYGEN SATURATION: 98 % | DIASTOLIC BLOOD PRESSURE: 70 MMHG | WEIGHT: 218 LBS | HEART RATE: 99 BPM | SYSTOLIC BLOOD PRESSURE: 112 MMHG | BODY MASS INDEX: 42.8 KG/M2

## 2024-10-25 DIAGNOSIS — M79.89 SOFT TISSUE MASS: ICD-10-CM

## 2024-10-25 DIAGNOSIS — K21.9 GASTROESOPHAGEAL REFLUX DISEASE, UNSPECIFIED WHETHER ESOPHAGITIS PRESENT: Primary | ICD-10-CM

## 2024-10-25 RX ORDER — PANTOPRAZOLE SODIUM 40 MG/1
40 TABLET, DELAYED RELEASE ORAL
Qty: 30 TABLET | Refills: 3 | Status: SHIPPED | OUTPATIENT
Start: 2024-10-25

## 2024-10-25 ASSESSMENT — ENCOUNTER SYMPTOMS
VOMITING: 0
DIARRHEA: 0
COUGH: 0
APNEA: 0
COLOR CHANGE: 0
SINUS PRESSURE: 0
SINUS PAIN: 0
CHEST TIGHTNESS: 0
ABDOMINAL PAIN: 0
SHORTNESS OF BREATH: 0
NAUSEA: 0

## 2024-10-25 NOTE — PROGRESS NOTES
Marcela ISMAEL Ryne  2000  10/25/24    Chief Complaint   Patient presents with    Follow-up     4 month follow up        SUBJECTIVE:      4 month follow up:    Has not completed her labs from new patient visit.     Patient's reflux well controlled on current medications. Patient denies any blood in stool black stool, heartburn, reflux. Denies issues with frequent coughing or reflux while sleeping. Able to eat and drink appropriately without complications. Since being switched over to pantoprazole her symptoms have resolved. If she misses a day or two her symptoms will return.     Still with soft tissue mass noted on her back. Has not changed in size and is not painful.     Review of Systems   Constitutional:  Negative for activity change, appetite change, fatigue and fever.   HENT:  Negative for congestion, nosebleeds, sinus pressure and sinus pain.    Respiratory:  Negative for apnea, cough, chest tightness and shortness of breath.    Cardiovascular:  Negative for chest pain and palpitations.   Gastrointestinal:  Negative for abdominal pain, diarrhea, nausea and vomiting.   Genitourinary:  Negative for difficulty urinating, flank pain and hematuria.   Musculoskeletal:  Negative for arthralgias, joint swelling and myalgias.   Skin:  Negative for color change and rash.   Neurological:  Negative for dizziness, light-headedness and headaches.   Psychiatric/Behavioral: Negative.  Negative for behavioral problems.        OBJECTIVE:    /70   Pulse 99   Resp 18   Ht 1.515 m (4' 11.65\")   Wt 98.9 kg (218 lb)   SpO2 98%   BMI 43.08 kg/m²     Physical Exam  Constitutional:       General: She is not in acute distress.     Appearance: She is well-developed. She is not diaphoretic.   HENT:      Head: Normocephalic and atraumatic.      Nose: Nose normal.      Mouth/Throat:      Pharynx: No oropharyngeal exudate.   Eyes:      Conjunctiva/sclera: Conjunctivae normal.      Pupils: Pupils are equal, round, and reactive

## 2024-11-08 ENCOUNTER — HOSPITAL ENCOUNTER (OUTPATIENT)
Age: 24
Discharge: HOME | End: 2024-11-08
Payer: COMMERCIAL

## 2024-11-08 DIAGNOSIS — Z00.00 ENCOUNTER FOR MEDICAL EXAMINATION TO ESTABLISH CARE: ICD-10-CM

## 2024-11-08 DIAGNOSIS — Z13.29 SCREENING FOR THYROID DISORDER: ICD-10-CM

## 2024-11-08 DIAGNOSIS — Z13.1 SCREENING FOR DIABETES MELLITUS: ICD-10-CM

## 2024-11-08 LAB
ALBUMIN SERPL-MCNC: 4 G/DL (ref 3.4–5)
ALBUMIN/GLOB SERPL: 1.4 {RATIO} (ref 1.1–2.2)
ALP SERPL-CCNC: 98 U/L (ref 40–129)
ALT SERPL-CCNC: 13 U/L (ref 10–40)
ANION GAP SERPL CALCULATED.3IONS-SCNC: 11 MMOL/L (ref 9–17)
AST SERPL-CCNC: 15 U/L (ref 15–37)
BASOPHILS # BLD: 0.02 K/UL
BASOPHILS NFR BLD: 0 % (ref 0–1)
BILIRUB SERPL-MCNC: 0.3 MG/DL (ref 0–1)
BUN SERPL-MCNC: 11 MG/DL (ref 7–20)
CALCIUM SERPL-MCNC: 9.5 MG/DL (ref 8.3–10.6)
CHLORIDE SERPL-SCNC: 103 MMOL/L (ref 99–110)
CHOLEST SERPL-MCNC: 167 MG/DL (ref 125–199)
CO2 SERPL-SCNC: 24 MMOL/L (ref 21–32)
CREAT SERPL-MCNC: 0.7 MG/DL (ref 0.6–1.1)
EOSINOPHIL # BLD: 0.13 K/UL
EOSINOPHILS RELATIVE PERCENT: 2 % (ref 0–3)
ERYTHROCYTE [DISTWIDTH] IN BLOOD BY AUTOMATED COUNT: 13 % (ref 11.7–14.9)
EST. AVERAGE GLUCOSE BLD GHB EST-MCNC: 116 MG/DL
GFR, ESTIMATED: >90 ML/MIN/1.73M2
GLUCOSE SERPL-MCNC: 90 MG/DL (ref 74–99)
HBA1C MFR BLD: 5.7 % (ref 4.2–6.3)
HCT VFR BLD AUTO: 43.6 % (ref 37–47)
HDLC SERPL-MCNC: 52 MG/DL
HGB BLD-MCNC: 13.9 G/DL (ref 12.5–16)
IMM GRANULOCYTES # BLD AUTO: 0.03 K/UL
IMM GRANULOCYTES NFR BLD: 0 %
LDLC SERPL CALC-MCNC: 104 MG/DL
LYMPHOCYTES NFR BLD: 3.22 K/UL
LYMPHOCYTES RELATIVE PERCENT: 39 % (ref 24–44)
MCH RBC QN AUTO: 28.3 PG (ref 27–31)
MCHC RBC AUTO-ENTMCNC: 31.9 G/DL (ref 32–36)
MCV RBC AUTO: 88.8 FL (ref 78–100)
MONOCYTES NFR BLD: 0.39 K/UL
MONOCYTES NFR BLD: 5 % (ref 0–4)
NEUTROPHILS NFR BLD: 55 % (ref 36–66)
NEUTS SEG NFR BLD: 4.57 K/UL
PLATELET # BLD AUTO: 371 K/UL (ref 140–440)
PMV BLD AUTO: 10.1 FL (ref 7.5–11.1)
POTASSIUM SERPL-SCNC: 4.8 MMOL/L (ref 3.5–5.1)
PROT SERPL-MCNC: 7 G/DL (ref 6.4–8.2)
RBC # BLD AUTO: 4.91 M/UL (ref 4.2–5.4)
SODIUM SERPL-SCNC: 138 MMOL/L (ref 136–145)
TRIGL SERPL-MCNC: 56 MG/DL
TSH SERPL DL<=0.05 MIU/L-ACNC: 1.25 UIU/ML (ref 0.27–4.2)
WBC OTHER # BLD: 8.4 K/UL (ref 4–10.5)

## 2024-11-08 PROCEDURE — 84443 ASSAY THYROID STIM HORMONE: CPT

## 2024-11-08 PROCEDURE — 80061 LIPID PANEL: CPT

## 2024-11-08 PROCEDURE — 80053 COMPREHEN METABOLIC PANEL: CPT

## 2024-11-08 PROCEDURE — 36415 COLL VENOUS BLD VENIPUNCTURE: CPT

## 2024-11-08 PROCEDURE — 85025 COMPLETE CBC W/AUTO DIFF WBC: CPT

## 2024-11-08 PROCEDURE — 83036 HEMOGLOBIN GLYCOSYLATED A1C: CPT

## 2025-01-17 ENCOUNTER — HOSPITAL ENCOUNTER (EMERGENCY)
Age: 25
Discharge: HOME | End: 2025-01-17
Attending: EMERGENCY MEDICINE
Payer: COMMERCIAL

## 2025-01-17 VITALS
WEIGHT: 210 LBS | BODY MASS INDEX: 42.33 KG/M2 | TEMPERATURE: 98.6 F | OXYGEN SATURATION: 93 % | SYSTOLIC BLOOD PRESSURE: 107 MMHG | DIASTOLIC BLOOD PRESSURE: 70 MMHG | RESPIRATION RATE: 18 BRPM | HEART RATE: 94 BPM | HEIGHT: 59 IN

## 2025-01-17 DIAGNOSIS — R11.2 NAUSEA AND VOMITING, UNSPECIFIED VOMITING TYPE: Primary | ICD-10-CM

## 2025-01-17 LAB
BACTERIA URNS QL MICRO: ABNORMAL
BILIRUB UR QL STRIP: ABNORMAL
CLARITY UR: CLEAR
COLOR UR: YELLOW
EPI CELLS #/AREA URNS HPF: ABNORMAL /HPF
GLUCOSE UR STRIP-MCNC: NEGATIVE MG/DL
HCG UR QL: NEGATIVE
HGB UR QL STRIP.AUTO: NEGATIVE
KETONES UR STRIP-MCNC: >80 MG/DL
LEUKOCYTE ESTERASE UR QL STRIP: ABNORMAL
NITRITE UR QL STRIP: NEGATIVE
PH UR STRIP: 7 [PH] (ref 5–8)
PROT UR STRIP-MCNC: NEGATIVE MG/DL
RBC #/AREA URNS HPF: ABNORMAL /HPF
SP GR UR STRIP: 1.01 (ref 1–1.03)
UROBILINOGEN UR STRIP-ACNC: 1 EU/DL (ref 0–1)
WBC #/AREA URNS HPF: ABNORMAL /HPF

## 2025-01-17 PROCEDURE — 6370000000 HC RX 637 (ALT 250 FOR IP): Performed by: EMERGENCY MEDICINE

## 2025-01-17 PROCEDURE — 99283 EMERGENCY DEPT VISIT LOW MDM: CPT

## 2025-01-17 PROCEDURE — 81001 URINALYSIS AUTO W/SCOPE: CPT

## 2025-01-17 PROCEDURE — 84703 CHORIONIC GONADOTROPIN ASSAY: CPT

## 2025-01-17 RX ORDER — ONDANSETRON 4 MG/1
8 TABLET, FILM COATED ORAL 3 TIMES DAILY PRN
Qty: 15 TABLET | Refills: 0 | Status: SHIPPED | OUTPATIENT
Start: 2025-01-17

## 2025-01-17 RX ORDER — ONDANSETRON 4 MG/1
8 TABLET, ORALLY DISINTEGRATING ORAL ONCE
Status: COMPLETED | OUTPATIENT
Start: 2025-01-17 | End: 2025-01-17

## 2025-01-17 RX ADMIN — ONDANSETRON 8 MG: 4 TABLET, ORALLY DISINTEGRATING ORAL at 21:15

## 2025-01-17 ASSESSMENT — PAIN - FUNCTIONAL ASSESSMENT: PAIN_FUNCTIONAL_ASSESSMENT: NONE - DENIES PAIN

## 2025-01-18 NOTE — ED NOTES
RN checked with patient again to see if she could give a urine sample yet. Patient states she does not feel like she could give one at this time but states that her nausea is better and requests some water. RN gave patient a cup of water, with Dr's OK.

## 2025-01-18 NOTE — ED NOTES
RN asked patient if she could give a urine sample at this time. Patient states she does not have urinate at this time and would like to wait to try.

## 2025-01-18 NOTE — ED TRIAGE NOTES
Patient presented to ED with complaint of n/v that started around 1400 today when she got home from work.

## 2025-01-18 NOTE — ED PROVIDER NOTES
does not appear dehydrated and I do not suspect electrolyte disturbances.  Patient is given dose of Zofran    Patient with improving symptoms on reevaluation.  Pregnancy test negative.  Plan for continued supportive care at home and return for any new or worsening symptoms.    Discharged in stable condition.    History from : Patient    Limitations to history : None    Patient was given the following medications:  Medications   ondansetron (ZOFRAN-ODT) disintegrating tablet 8 mg (8 mg Oral Given 1/17/25 2115)       Independent Imaging Interpretation by me:     EKG (if obtained): (All EKG's are interpreted by myself in the absence of a cardiologist)     Chronic conditions affecting care:     Discussion with Other Profesionals : None    Social Determinants : None    Disposition Considerations (tests considered but not done, Shared Decision Making, Pt Expectation of Test or Tx.):     Appropriate for outpatient management      I am the Primary Clinician of Record.                  Clinical Impression:  1. Nausea and vomiting, unspecified vomiting type      (Please note that portions of this note may have been completed with a voice recognition program. Efforts were made to edit the dictations but occasionally words are mis-transcribed.)    MD CAROLANN Peñaloza Ryan, MD  01/17/25 0084

## 2025-01-22 ENCOUNTER — OFFICE VISIT (OUTPATIENT)
Dept: INTERNAL MEDICINE CLINIC | Age: 25
End: 2025-01-22

## 2025-01-22 VITALS
WEIGHT: 213 LBS | BODY MASS INDEX: 42.94 KG/M2 | HEART RATE: 93 BPM | OXYGEN SATURATION: 95 % | SYSTOLIC BLOOD PRESSURE: 116 MMHG | HEIGHT: 59 IN | RESPIRATION RATE: 20 BRPM | DIASTOLIC BLOOD PRESSURE: 70 MMHG

## 2025-01-22 DIAGNOSIS — T36.95XA ANTIBIOTIC-INDUCED YEAST INFECTION: ICD-10-CM

## 2025-01-22 DIAGNOSIS — N30.01 ACUTE CYSTITIS WITH HEMATURIA: Primary | ICD-10-CM

## 2025-01-22 DIAGNOSIS — B37.9 ANTIBIOTIC-INDUCED YEAST INFECTION: ICD-10-CM

## 2025-01-22 LAB
BILIRUBIN, POC: NORMAL
BLOOD URINE, POC: NORMAL
CLARITY, POC: CLEAR
COLOR, POC: NORMAL
GLUCOSE URINE, POC: NORMAL MG/DL
KETONES, POC: NORMAL MG/DL
LEUKOCYTE EST, POC: NORMAL
NITRITE, POC: NORMAL
PH, POC: 5.5
PROTEIN, POC: NORMAL MG/DL
SPECIFIC GRAVITY, POC: 1.02
UROBILINOGEN, POC: 1 MG/DL

## 2025-01-22 RX ORDER — FLUCONAZOLE 150 MG/1
150 TABLET ORAL DAILY
Qty: 1 TABLET | Refills: 0 | Status: SHIPPED | OUTPATIENT
Start: 2025-01-22 | End: 2025-01-23

## 2025-01-22 RX ORDER — ONDANSETRON 4 MG/1
4 TABLET, ORALLY DISINTEGRATING ORAL 3 TIMES DAILY PRN
Qty: 21 TABLET | Refills: 0 | Status: SHIPPED | OUTPATIENT
Start: 2025-01-22

## 2025-01-22 RX ORDER — NITROFURANTOIN 25; 75 MG/1; MG/1
100 CAPSULE ORAL 2 TIMES DAILY
Qty: 14 CAPSULE | Refills: 0 | Status: SHIPPED | OUTPATIENT
Start: 2025-01-22 | End: 2025-01-29

## 2025-01-22 SDOH — ECONOMIC STABILITY: FOOD INSECURITY: WITHIN THE PAST 12 MONTHS, YOU WORRIED THAT YOUR FOOD WOULD RUN OUT BEFORE YOU GOT MONEY TO BUY MORE.: SOMETIMES TRUE

## 2025-01-22 SDOH — ECONOMIC STABILITY: FOOD INSECURITY: WITHIN THE PAST 12 MONTHS, YOU WORRIED THAT YOUR FOOD WOULD RUN OUT BEFORE YOU GOT MONEY TO BUY MORE.: NEVER TRUE

## 2025-01-22 SDOH — ECONOMIC STABILITY: TRANSPORTATION INSECURITY
IN THE PAST 12 MONTHS, HAS THE LACK OF TRANSPORTATION KEPT YOU FROM MEDICAL APPOINTMENTS OR FROM GETTING MEDICATIONS?: NO

## 2025-01-22 SDOH — ECONOMIC STABILITY: INCOME INSECURITY: IN THE LAST 12 MONTHS, WAS THERE A TIME WHEN YOU WERE NOT ABLE TO PAY THE MORTGAGE OR RENT ON TIME?: NO

## 2025-01-22 SDOH — ECONOMIC STABILITY: FOOD INSECURITY: WITHIN THE PAST 12 MONTHS, THE FOOD YOU BOUGHT JUST DIDN'T LAST AND YOU DIDN'T HAVE MONEY TO GET MORE.: NEVER TRUE

## 2025-01-22 SDOH — ECONOMIC STABILITY: FOOD INSECURITY: WITHIN THE PAST 12 MONTHS, THE FOOD YOU BOUGHT JUST DIDN'T LAST AND YOU DIDN'T HAVE MONEY TO GET MORE.: SOMETIMES TRUE

## 2025-01-22 SDOH — ECONOMIC STABILITY: TRANSPORTATION INSECURITY
IN THE PAST 12 MONTHS, HAS LACK OF TRANSPORTATION KEPT YOU FROM MEETINGS, WORK, OR FROM GETTING THINGS NEEDED FOR DAILY LIVING?: NO

## 2025-01-22 ASSESSMENT — PATIENT HEALTH QUESTIONNAIRE - PHQ9
SUM OF ALL RESPONSES TO PHQ9 QUESTIONS 1 & 2: 2
SUM OF ALL RESPONSES TO PHQ QUESTIONS 1-9: 2
SUM OF ALL RESPONSES TO PHQ QUESTIONS 1-9: 2
2. FEELING DOWN, DEPRESSED OR HOPELESS: SEVERAL DAYS
2. FEELING DOWN, DEPRESSED OR HOPELESS: SEVERAL DAYS
SUM OF ALL RESPONSES TO PHQ9 QUESTIONS 1 & 2: 2
SUM OF ALL RESPONSES TO PHQ QUESTIONS 1-9: 2
SUM OF ALL RESPONSES TO PHQ QUESTIONS 1-9: 2
1. LITTLE INTEREST OR PLEASURE IN DOING THINGS: SEVERAL DAYS
1. LITTLE INTEREST OR PLEASURE IN DOING THINGS: SEVERAL DAYS

## 2025-01-22 ASSESSMENT — ENCOUNTER SYMPTOMS
COLOR CHANGE: 0
SINUS PAIN: 0
SINUS PRESSURE: 0
SHORTNESS OF BREATH: 0
CHEST TIGHTNESS: 0
VOMITING: 0
NAUSEA: 1
APNEA: 0
DIARRHEA: 0
ABDOMINAL PAIN: 0
COUGH: 0

## 2025-01-22 NOTE — PROGRESS NOTES
Marcela Michele  2000  01/22/25    Chief Complaint   Patient presents with    Dysuria     Burning during urination, nausea sx started Saturday. Pt reports that she had a stomach bug on Friday and then woke up on Saturday with sx. Pt would like to have diflucan to help after an antibiotic if one is prescribed        SUBJECTIVE:      Marcela presents to the office this afternoon for c/o issues with nausea and vomiting last Friday, then the next day (4 days ago), she she has been having worsening urinary frequency, mild dysuria, and urgency. Denies any flank pain, hematuria, fevers, chills. POCT UA today significant for moderate leukocytes and trace blood, but negative for nitrites.     Also experience vaginal yeast infection with any ABX use and requesting script today as well.     Review of Systems   Constitutional:  Negative for activity change, appetite change, fatigue and fever.   HENT:  Negative for congestion, nosebleeds, sinus pressure and sinus pain.    Respiratory:  Negative for apnea, cough, chest tightness and shortness of breath.    Cardiovascular:  Negative for chest pain and palpitations.   Gastrointestinal:  Positive for nausea. Negative for abdominal pain, diarrhea and vomiting.   Genitourinary:  Positive for dysuria and urgency. Negative for difficulty urinating, flank pain and hematuria.   Musculoskeletal:  Negative for arthralgias, joint swelling and myalgias.   Skin:  Negative for color change and rash.   Neurological:  Negative for dizziness, light-headedness and headaches.   Psychiatric/Behavioral: Negative.  Negative for behavioral problems.        OBJECTIVE:    /70   Pulse 93   Resp 20   Ht 1.499 m (4' 11.02\")   Wt 96.6 kg (213 lb)   LMP 01/07/2025   SpO2 95%   BMI 43.00 kg/m²     Physical Exam  Constitutional:       General: She is not in acute distress.     Appearance: She is well-developed. She is not diaphoretic.   HENT:      Head: Normocephalic and atraumatic.

## 2025-01-24 LAB
BACTERIA UR CULT: ABNORMAL
ORGANISM: ABNORMAL

## 2025-03-04 DIAGNOSIS — K21.9 GASTROESOPHAGEAL REFLUX DISEASE, UNSPECIFIED WHETHER ESOPHAGITIS PRESENT: ICD-10-CM

## 2025-03-04 RX ORDER — PANTOPRAZOLE SODIUM 40 MG/1
40 TABLET, DELAYED RELEASE ORAL
Qty: 30 TABLET | Refills: 3 | Status: SHIPPED | OUTPATIENT
Start: 2025-03-04

## 2025-04-28 ENCOUNTER — OFFICE VISIT (OUTPATIENT)
Dept: INTERNAL MEDICINE CLINIC | Age: 25
End: 2025-04-28
Payer: COMMERCIAL

## 2025-04-28 VITALS
DIASTOLIC BLOOD PRESSURE: 68 MMHG | HEART RATE: 98 BPM | SYSTOLIC BLOOD PRESSURE: 112 MMHG | HEIGHT: 59 IN | WEIGHT: 206 LBS | OXYGEN SATURATION: 99 % | BODY MASS INDEX: 41.53 KG/M2 | RESPIRATION RATE: 20 BRPM

## 2025-04-28 DIAGNOSIS — R73.03 PRE-DIABETES: Primary | ICD-10-CM

## 2025-04-28 DIAGNOSIS — E78.00 ELEVATED LDL CHOLESTEROL LEVEL: ICD-10-CM

## 2025-04-28 DIAGNOSIS — K21.9 GASTROESOPHAGEAL REFLUX DISEASE, UNSPECIFIED WHETHER ESOPHAGITIS PRESENT: ICD-10-CM

## 2025-04-28 PROCEDURE — 99214 OFFICE O/P EST MOD 30 MIN: CPT | Performed by: NURSE PRACTITIONER

## 2025-04-28 PROCEDURE — G2211 COMPLEX E/M VISIT ADD ON: HCPCS | Performed by: NURSE PRACTITIONER

## 2025-04-28 ASSESSMENT — ENCOUNTER SYMPTOMS
SHORTNESS OF BREATH: 0
APNEA: 0
VOMITING: 0
NAUSEA: 0
SINUS PRESSURE: 0
DIARRHEA: 0
ABDOMINAL PAIN: 0
COLOR CHANGE: 0
SINUS PAIN: 0
CHEST TIGHTNESS: 0
COUGH: 0

## 2025-04-28 NOTE — PROGRESS NOTES
2025    Marcela Michele (:  2000) is a 24 y.o. female, here for a preventive medicine evaluation.    Subjective   Patient Active Problem List   Diagnosis    Gastroesophageal reflux disease     6 month follow up:    GERD- protonix    Hemoglobin A1C   Date Value Ref Range Status   2024 5.7 4.2 - 6.3 % Final     Lab Results   Component Value Date     (H) 2024         Review of Systems    Prior to Visit Medications    Medication Sig Taking? Authorizing Provider   pantoprazole (PROTONIX) 40 MG tablet TAKE ONE TABLET BY MOUTH EVERY MORNING BEFORE BREAKFAST Yes Ed Lopez APRN - CNP   norethindrone-ethinyl estradiol (JUNEL FE 1/20) 1-20 MG-MCG per tablet Take 1 mg by mouth daily Yes Lakesha Clayton MD   Cyanocobalamin (B-12 PO) Take by mouth  Patient not taking: Reported on 2025  Lakesha Clayton MD        No Known Allergies    Past Medical History:   Diagnosis Date    GERD (gastroesophageal reflux disease)        Past Surgical History:   Procedure Laterality Date    APPENDECTOMY      WISDOM TOOTH EXTRACTION         Social History     Socioeconomic History    Marital status: Single     Spouse name: Not on file    Number of children: Not on file    Years of education: Not on file    Highest education level: Not on file   Occupational History    Not on file   Tobacco Use    Smoking status: Never    Smokeless tobacco: Never   Vaping Use    Vaping status: Never Used   Substance and Sexual Activity    Alcohol use: Yes     Comment: occ    Drug use: No    Sexual activity: Never   Other Topics Concern    Not on file   Social History Narrative    Not on file     Social Drivers of Health     Financial Resource Strain: Low Risk  (2024)    Overall Financial Resource Strain (CARDIA)     Difficulty of Paying Living Expenses: Not hard at all   Food Insecurity: No Food Insecurity (2025)    Hunger Vital Sign     Worried About Running Out of Food in the Last Year: Never 
treatment, including any medications, possible imaging, referrals, and follow ups discussed with patient. All risks and benefits and possible side effects discussed with patient who agrees to plan of care and verbalizes understanding. All labs and imaging reviewed.            No data to display                Return in about 6 months (around 10/28/2025).    Rachana note this reports has been produced speech recognition software and may contain errors related to that system including errors in grammar, punctuation, and spelling, as well as words and phrases that may be appropriate. If there are any questions or concerns please feel free to contact the dictating provider for clarification.

## 2025-05-13 ENCOUNTER — TELEPHONE (OUTPATIENT)
Dept: INTERNAL MEDICINE CLINIC | Age: 25
End: 2025-05-13

## 2025-05-13 RX ORDER — SENNOSIDES A AND B 8.6 MG/1
1 TABLET, FILM COATED ORAL 2 TIMES DAILY PRN
Qty: 30 TABLET | Refills: 0 | Status: SHIPPED | OUTPATIENT
Start: 2025-05-13 | End: 2025-05-28

## 2025-05-13 NOTE — TELEPHONE ENCOUNTER
Pt called and reported that she is still having difficulty with passing stool. She reports she has been taking miralax everyday but is still experiencing pain. She also noted that yesterday she noticed the left side of her abdomin distending. Pt would like to know if she should continue daily miralax or if she would need to come in to be evaluated. Please advise.